# Patient Record
Sex: MALE
[De-identification: names, ages, dates, MRNs, and addresses within clinical notes are randomized per-mention and may not be internally consistent; named-entity substitution may affect disease eponyms.]

---

## 2018-08-10 ENCOUNTER — HOSPITAL ENCOUNTER (EMERGENCY)
Dept: HOSPITAL 97 - ER | Age: 43
Discharge: HOME | End: 2018-08-10
Payer: COMMERCIAL

## 2018-08-10 DIAGNOSIS — L02.11: Primary | ICD-10-CM

## 2018-08-10 DIAGNOSIS — I10: ICD-10-CM

## 2018-08-10 DIAGNOSIS — E78.5: ICD-10-CM

## 2018-08-10 DIAGNOSIS — Z79.82: ICD-10-CM

## 2018-08-10 DIAGNOSIS — E11.9: ICD-10-CM

## 2018-08-10 PROCEDURE — 99284 EMERGENCY DEPT VISIT MOD MDM: CPT

## 2018-08-10 PROCEDURE — 0J950ZZ DRAINAGE OF LEFT NECK SUBCUTANEOUS TISSUE AND FASCIA, OPEN APPROACH: ICD-10-PCS

## 2018-08-10 NOTE — ER
Nurse's Notes                                                                                     

 [facility]                                                                

Name: Homer Vines IV                                                                              

Age: 43 yrs                                                                                       

Sex: Male                                                                                         

: 1975                                                                                   

MRN: [id number]                                                                                   

Arrival Date: 08/10/2018                                                                          

Time: 18:50                                                                                       

Account#: [id number]                                                                            

Bed 27                                                                                            

Private MD: Out, [facility]                                                                    

Diagnosis: Abscess back of neck                                                                   

                                                                                                  

Presentation:                                                                                     

08/10                                                                                             

18:53 Presenting complaint: Patient states: i noticed a lump/ abscess on the back of my neck, hj  

      started last Monday, Tuesday went to my PCP, Rx with amoxicillin, went back to PCP, he      

      lanced it; he didn't squeezed it, now i fell like it needs to be lanced more; reports       

      fever; pain /10;. Transition of care: patient was not received from another setting of     

      care. Onset of symptoms was August 10, 2018. Risk Assessment: Do you want to hurt           

      yourself or someone else? Patient reports no desire to harm self or others. Initial         

      Sepsis Screen: Does the patient meet any 2 criteria? No. Patient's initial sepsis           

      screen is negative. Does the patient have a suspected source of infection? No.              

      Patient's initial sepsis screen is negative. Care prior to arrival: None.                   

18:53 Method Of Arrival: Ambulatory                                                             

18:53 Acuity: CALE 3                                                                             

                                                                                                  

Triage Assessment:                                                                                

18:58 General: Appears in no apparent distress. uncomfortable, Behavior is calm, cooperative, hj  

      appropriate for age. Pain: Complains of pain in base of the skull.                          

                                                                                                  

Historical:                                                                                       

- Allergies:                                                                                      

18:57 No Known Allergies;                                                                     hj  

- Home Meds:                                                                                      

18:57 losartan oral oral [Active]; Bystolic oral oral [Active]; Metformin Oral [Active];      hj  

      Aspirin Oral [Active];                                                                      

- PMHx:                                                                                           

18:57 Hyperlipidemia; Hypertension; Diabetes - NIDDM;                                         hj  

- PSHx:                                                                                           

18:57 Appendectomy;                                                                           hj  

                                                                                                  

- Immunization history:: Adult Immunizations up to date.                                          

- Social history:: Smoking status: Patient/guardian denies using tobacco,                         

  Patient/guardian denies using alcohol.                                                          

- Ebola Screening: : Patient negative for fever greater than or equal to 101.5 degrees            

  Fahrenheit, and additional compatible Ebola Virus Disease symptoms Patient denies               

  exposure to infectious person Patient denies travel to an Ebola-affected area in the            

  21 days before illness onset.                                                                   

                                                                                                  

                                                                                                  

Screenin:58 Abuse screen: Denies threats or abuse. Denies injuries from another. Nutritional        hj  

      screening: No deficits noted. Tuberculosis screening: No symptoms or risk factors           

      identified. Fall Risk None identified.                                                      

                                                                                                  

Assessment:                                                                                       

19:49 General: Appears in no apparent distress. comfortable. Derm: Abscess located on nape is mg2 

      quarter sized, has purulent drainage, is red, is raised.                                    

                                                                                                  

Vital Signs:                                                                                      

18:58  / 103; Pulse 85; Resp 18; Temp 97.2(TE); Pulse Ox 98% on R/A; Weight 80.29 kg;   hj  

      Height 5 ft. 8 in. (172.72 cm); Pain 4/10;                                                  

18:58 Body Mass Index 26.91 (80.29 kg, 172.72 cm)                                               

                                                                                                  

ED Course:                                                                                        

18:50 Patient arrived in ED.                                                                  sb2 

18:51 Out, of Town is Private Physician.                                                      sb2 

18:56 Triage completed.                                                                       hj  

18:58 Arm band placed on right wrist.                                                         hj  

18:58 Patient has correct armband on for positive identification. Bed in low position. Call   hj  

      light in reach. Side rails up X 1.                                                          

19:06 Renzo Francisco MD is Attending Physician.                                                    pkl 

19:07 Gaurav Morgan, NOEMÍ is Primary Nurse.                                                  mg2 

19:39 Refugio Calloway MD is Referral Physician.                                                  pkl 

19:48 Assist provider with I \T\ D: Set up I\T\D tray. Performed by Renzo Francisco MD Culture sent to    mg
2

      lab. Wound packed. 4X4s, Dressing with 4X4s, Patient tolerated well. Patient did not        

      have IV access during this emergency room visit.                                            

                                                                                                  

Administered Medications:                                                                         

19:47 Drug: Bactrim (160 mg-800 mg (DS) 1 tablet Route: PO;                                   mg2 

19:47 Follow up: Response: No adverse reaction; Medication administered at discharge.         mg2 

                                                                                                  

                                                                                                  

Outcome:                                                                                          

19:39 Discharge ordered by MD.                                                                pkl 

19:50 Discharged to home ambulatory.                                                          mg2 

19:50 Condition: good                                                                             

19:50 Discharge instructions given to patient, Instructed on discharge instructions, follow       

      up and referral plans. medication usage, Demonstrated understanding of instructions,        

      follow-up care, medications, Prescriptions given X 1.                                       

19:50 Patient left the ED.                                                                    mg2 

                                                                                                  

Signatures:                                                                                       

Renzo Francisco MD MD   pkl                                                  

Fortunato Madrid RN                      RN                                                      

Candice Dia                               sb2                                                  

Gaurav Morgan, NOEMÍ                    RN   mg2                                                  

                                                                                                  

Corrections: (The following items were deleted from the chart)                                    

19:00 18:58 Pulse 85bpm; Resp 18bpm; Pulse Ox 98% RA; Temp 97.2F Temporal; 80.29 kg; Height 5 hj  

      ft. 8 in.; BMI: 26.9; Pain 4/10; hj                                                         

                                                                                                  

**************************************************************************************************

## 2018-08-10 NOTE — EDPHYS
Physician Documentation                                                                           

 [facility]                                                                

Name: Homer Vines IV                                                                              

Age: 43 yrs                                                                                       

Sex: Male                                                                                         

: 1975                                                                                   

MRN: [id number]                                                                                   

Arrival Date: 08/10/2018                                                                          

Time: 18:50                                                                                       

Account#: [id number]                                                                            

Bed 27                                                                                            

Private MD: Out, [facility]                                                                    

ED Physician Renzo Francisco                                                                             

HPI:                                                                                              

08/10                                                                                             

19:11 This 43 yrs old  Male presents to ER via Ambulatory with complaints of Wound    pkl 

      Infection - NECK.                                                                           

19:11 The patient presents with an abscess of the back of neck. Description: swollen, tense.  pkl 

      Onset: The symptoms/episode began/occurred 5 day(s) ago. Seen by PCP earlier this week.     

      Had it lanced, but not much better..                                                        

                                                                                                  

Historical:                                                                                       

- Allergies:                                                                                      

18:57 No Known Allergies;                                                                     hj  

- Home Meds:                                                                                      

18:57 losartan oral oral [Active]; Bystolic oral oral [Active]; Metformin Oral [Active];      hj  

      Aspirin Oral [Active];                                                                      

- PMHx:                                                                                           

18:57 Hyperlipidemia; Hypertension; Diabetes - NIDDM;                                         hj  

- PSHx:                                                                                           

18:57 Appendectomy;                                                                           hj  

                                                                                                  

- Immunization history:: Adult Immunizations up to date.                                          

- Social history:: Smoking status: Patient/guardian denies using tobacco,                         

  Patient/guardian denies using alcohol.                                                          

- Ebola Screening: : Patient negative for fever greater than or equal to 101.5 degrees            

  Fahrenheit, and additional compatible Ebola Virus Disease symptoms Patient denies               

  exposure to infectious person Patient denies travel to an Ebola-affected area in the            

  21 days before illness onset.                                                                   

                                                                                                  

                                                                                                  

ROS:                                                                                              

19:11 Eyes: Negative for injury, pain, redness, and discharge, ENT: Negative for injury,      pkl 

      pain, and discharge.                                                                        

19:11 Neck: Positive for of the back  of  neck, abscess.                                          

19:11 Cardiovascular: Negative for chest pain.                                                    

19:11 Respiratory: Negative for shortness of breath.                                              

19:11 Abdomen/GI: Negative for abdominal pain, nausea, vomiting, and diarrhea.                    

19:11 Back: Negative for acute changes.                                                           

19:11 : Negative for urinary symptoms.                                                          

19:11 MS/extremity: Negative for acute changes.                                                   

19:11 Skin: Positive for abscess, of the back  of  neck.                                          

19:11 Neuro: Negative for altered mental status.                                                  

                                                                                                  

Exam:                                                                                             

19:11 Head/Face:  Normocephalic, atraumatic. Eyes:  Pupils equal round and reactive to light, pkl 

      extra-ocular motions intact.  Lids and lashes normal.  Conjunctiva and sclera are           

      non-icteric and not injected.  Cornea within normal limits.  Periorbital areas with no      

      swelling, redness, or edema. ENT:  Nares patent. No nasal discharge, no septal              

      abnormalities noted.  Tympanic membranes are normal and external auditory canals are        

      clear.  Oropharynx with no redness, swelling, or masses, exudates, or evidence of           

      obstruction, uvula midline.  Mucous membranes moist.                                        

19:11 Neck: Abscess  back  of  neck.  Small  incision  noted.  Area  still  tender  and           

      indurated..                                                                                 

19:11 Chest/axilla: Exam negative for acute changes.                                              

19:11 Cardiovascular: Rate: normal, Rhythm: regular.                                              

19:11 Respiratory: the patient does not display signs of respiratory distress,  Respirations:     

      normal, Breath sounds: are clear throughout.                                                

19:11 Abdomen/GI: Bowel sounds: normal, Palpation: abdomen is soft and non-tender, in all         

      quadrants.                                                                                  

19:11 Back: Exam negative for acute changes.                                                      

19:11 : Exam negative for acute changes.                                                        

19:11 Musculoskeletal/extremity: Exam is negative for acute changes.                              

19:11 Neuro: Orientation: is normal, Mentation: is normal, Cranial nerves: grossly normal,        

      Motor: is normal.                                                                           

                                                                                                  

Vital Signs:                                                                                      

18:58  / 103; Pulse 85; Resp 18; Temp 97.2(TE); Pulse Ox 98% on R/A; Weight 80.29 kg;   hj  

      Height 5 ft. 8 in. (172.72 cm); Pain 4/10;                                                  

18:58 Body Mass Index 26.91 (80.29 kg, 172.72 cm)                                               

                                                                                                  

Procedures:                                                                                       

19:37 I \T\ D: Incision and drainage was performed for an abscess of the back of neck Prepped   pkl

      with Betadine, Anesthetized with 3 ml's 1% Lidocaine. Incised with #11 blade. Drained       

      large amount purulent fluid. Packed with iodoform gauze, Dressing: sterile 4x4 gauze,       

      the patient tolerated the procedure well.                                                   

                                                                                                  

MDM:                                                                                              

19:06 Patient medically screened.                                                             pkl 

19:37 Data reviewed: vital signs, nurses notes.                                               pkl 

                                                                                                  

Administered Medications:                                                                         

19:47 Drug: Bactrim (160 mg-800 mg (DS) 1 tablet Route: PO;                                   mg2 

19:47 Follow up: Response: No adverse reaction; Medication administered at discharge.         mg2 

                                                                                                  

                                                                                                  

Disposition:                                                                                      

08/10/18 19:39 Discharged to Home. Impression: Abscess back of neck.                              

- Condition is Stable.                                                                            

                                                                                                  

- Prescriptions for Bactrim - 160 mg Oral Tablet - take 1 tablet by ORAL route              

  every 12 hours for 7 days; 14 tablet.                                                           

- Medication Reconciliation Form, Thank You Letter, Antibiotic Education, Prescription            

  Opioid Use form.                                                                                

- Follow up: Refugio Calloway MD; When: 2 - 3 days; Reason: Re-evaluation by your physician.          

- Problem is new.                                                                                 

- Symptoms have improved.                                                                         

                                                                                                  

                                                                                                  

                                                                                                  

Signatures:                                                                                       

Dispatcher [employer]                           EDMS                                                 

Renzo Francisco MD MD   pkl                                                  

Fortunato Madrid RN                      RN                                                      

Gaurav Morgan RN                    RN   mg2                                                  

                                                                                                  

Corrections: (The following items were deleted from the chart)                                    

19:50 19:39 08/10/2018 19:39 Discharged to Home. Impression: Abscess back of neck. Condition  mg2 

      is Stable. Forms are Medication Reconciliation Form, Thank You Letter, Antibiotic           

      Education, Prescription Opioid Use. Follow up: Dr. Refugio Calloway; When: 2 - 3 days;            

      Reason: Re-evaluation by your physician. Problem is new. Symptoms have improved. pkl        

                                                                                                  

**************************************************************************************************